# Patient Record
Sex: MALE | Race: WHITE | NOT HISPANIC OR LATINO | Employment: OTHER | URBAN - METROPOLITAN AREA
[De-identification: names, ages, dates, MRNs, and addresses within clinical notes are randomized per-mention and may not be internally consistent; named-entity substitution may affect disease eponyms.]

---

## 2023-03-27 LAB
PROSTATE SPECIFIC AG (NG/ML) IN SER/PLAS: 7.7 NG/ML (ref 0–4)
PROSTATE SPECIFIC AG FREE (NG/ML) IN SER/PLAS: 2.6 NG/ML
PROSTATE SPECIFIC AG FREE/PROSTATE SPECIFIC AG TOTAL IN SER/PLAS: 34 %

## 2024-02-16 PROBLEM — Z95.1 S/P CABG X 3: Status: ACTIVE | Noted: 2024-02-16

## 2024-02-16 PROBLEM — E11.9 DIABETES MELLITUS (MULTI): Status: ACTIVE | Noted: 2024-02-16

## 2024-02-16 PROBLEM — I44.7 LEFT BUNDLE BRANCH BLOCK: Status: ACTIVE | Noted: 2024-02-16

## 2024-02-16 PROBLEM — I35.1 MILD AORTIC INSUFFICIENCY: Status: ACTIVE | Noted: 2024-02-16

## 2024-02-16 PROBLEM — I25.10 CAD (CORONARY ARTERY DISEASE): Status: ACTIVE | Noted: 2024-02-16

## 2024-02-16 RX ORDER — INSULIN GLARGINE 100 [IU]/ML
INJECTION, SOLUTION SUBCUTANEOUS
COMMUNITY
Start: 2022-01-18

## 2024-02-16 RX ORDER — SELENIUM 50 MCG
TABLET ORAL
COMMUNITY

## 2024-02-16 RX ORDER — L. ACIDOPHILUS/L.BULGARICUS 1MM CELL
1 TABLET ORAL DAILY
COMMUNITY
Start: 2022-01-05

## 2024-02-16 RX ORDER — AMLODIPINE BESYLATE 5 MG/1
1 TABLET ORAL DAILY
COMMUNITY
Start: 2022-01-05

## 2024-02-16 RX ORDER — NAPROXEN SODIUM 220 MG/1
1 TABLET, FILM COATED ORAL DAILY
COMMUNITY
Start: 2022-01-18

## 2024-02-16 RX ORDER — ACETAMINOPHEN 325 MG/1
TABLET ORAL EVERY 6 HOURS
COMMUNITY
Start: 2022-01-18

## 2024-02-16 RX ORDER — POLYETHYLENE GLYCOL 3350 17 G/17G
17 POWDER, FOR SOLUTION ORAL
COMMUNITY
Start: 2022-01-16

## 2024-02-16 RX ORDER — ERGOCALCIFEROL 1.25 MG/1
CAPSULE ORAL
COMMUNITY

## 2024-02-16 RX ORDER — IRON POLYSACCHARIDE COMPLEX 150 MG
150 CAPSULE ORAL
COMMUNITY
Start: 2022-01-18

## 2024-02-16 RX ORDER — ATORVASTATIN CALCIUM 80 MG/1
80 TABLET, FILM COATED ORAL DAILY
COMMUNITY
Start: 2022-01-05

## 2024-02-16 RX ORDER — LOSARTAN POTASSIUM 100 MG/1
100 TABLET ORAL DAILY
COMMUNITY
Start: 2022-01-05

## 2024-02-16 RX ORDER — CHOLECALCIFEROL (VITAMIN D3) 1250 MCG
TABLET ORAL
COMMUNITY
Start: 2022-01-05

## 2024-02-16 RX ORDER — AMOXICILLIN 250 MG
CAPSULE ORAL 2 TIMES DAILY PRN
COMMUNITY
Start: 2022-01-16

## 2024-02-16 RX ORDER — FUROSEMIDE 20 MG/1
20 TABLET ORAL DAILY
COMMUNITY
Start: 2022-01-05

## 2024-02-16 RX ORDER — CARVEDILOL 6.25 MG/1
6.25 TABLET ORAL
COMMUNITY
Start: 2022-01-05

## 2024-02-16 RX ORDER — MULTIVITAMIN
1 TABLET ORAL DAILY
COMMUNITY
Start: 2022-01-05

## 2024-07-15 ENCOUNTER — APPOINTMENT (OUTPATIENT)
Dept: CARDIOLOGY | Facility: CLINIC | Age: 79
End: 2024-07-15
Payer: MEDICARE

## 2024-07-15 VITALS
HEIGHT: 68 IN | OXYGEN SATURATION: 98 % | DIASTOLIC BLOOD PRESSURE: 64 MMHG | HEART RATE: 85 BPM | SYSTOLIC BLOOD PRESSURE: 150 MMHG | BODY MASS INDEX: 23.34 KG/M2 | WEIGHT: 154 LBS

## 2024-07-15 DIAGNOSIS — I25.10 CORONARY ARTERY DISEASE INVOLVING NATIVE CORONARY ARTERY OF NATIVE HEART WITHOUT ANGINA PECTORIS: Primary | ICD-10-CM

## 2024-07-15 DIAGNOSIS — I10 PRIMARY HYPERTENSION: ICD-10-CM

## 2024-07-15 DIAGNOSIS — I44.7 LEFT BUNDLE BRANCH BLOCK: ICD-10-CM

## 2024-07-15 PROCEDURE — 1126F AMNT PAIN NOTED NONE PRSNT: CPT | Performed by: INTERNAL MEDICINE

## 2024-07-15 PROCEDURE — 99214 OFFICE O/P EST MOD 30 MIN: CPT | Performed by: INTERNAL MEDICINE

## 2024-07-15 PROCEDURE — 1159F MED LIST DOCD IN RCRD: CPT | Performed by: INTERNAL MEDICINE

## 2024-07-15 PROCEDURE — 1036F TOBACCO NON-USER: CPT | Performed by: INTERNAL MEDICINE

## 2024-07-15 PROCEDURE — 3078F DIAST BP <80 MM HG: CPT | Performed by: INTERNAL MEDICINE

## 2024-07-15 PROCEDURE — 3077F SYST BP >= 140 MM HG: CPT | Performed by: INTERNAL MEDICINE

## 2024-07-15 RX ORDER — SEMAGLUTIDE 1.34 MG/ML
0.5 INJECTION, SOLUTION SUBCUTANEOUS
COMMUNITY

## 2024-07-15 RX ORDER — AMLODIPINE BESYLATE 5 MG/1
5 TABLET ORAL DAILY
Qty: 30 TABLET | Refills: 11 | Status: SHIPPED | OUTPATIENT
Start: 2024-07-15 | End: 2025-07-15

## 2024-07-15 ASSESSMENT — PAIN SCALES - GENERAL: PAINLEVEL: 0-NO PAIN

## 2024-07-15 NOTE — PROGRESS NOTES
Name : Kasi Luna   : 1945   MRN : [id number]   ENC Date : 07/15/2024      Assessment and Plan:  Hypertension: Blood pressures not adequately controlled.  Patient was agreeable to increasing amlodipine to 5 mg daily.  I gave him a printed prescription as [facility].  Coronary artery disease: Status post CABG.  No angina.  Continue aspirin statin and beta-blocker therapy indefinitely.  Left bundle branch block: No syncopal events.  No indication for pacemaker.  Disp: RTO in 1 year    HPI:  Patient returns today feeling quite well.  He reports no angina.  No palpitations.  No syncopal events.  No dyspnea with exertion.  Patient states he does get some intermittent lower extremity edema but he had none on exam today.  He does state that his blood pressure is now running high at home as well as in the physicians office.    Problem list overview:   Patient Active Problem List   Diagnosis    Left bundle branch block    Diabetes mellitus (Multi)    CAD (coronary artery disease)    Mild aortic insufficiency    S/P CABG x 3    Primary hypertension       Meds:   Current Outpatient Medications on File Prior to Visit   Medication Sig Dispense Refill    atorvastatin (Lipitor) 80 mg tablet Take 0.5 tablets (40 mg) by mouth once daily.      semaglutide (Ozempic) 0.25 mg or 0.5 mg(2 mg/1.5 mL) pen injector Inject 0.5 mg under the skin 1 (one) time per week.      [DISCONTINUED] amLODIPine (Norvasc) 2.5 mg tablet Take 1 tablet (2.5 mg) by mouth once daily.      acetaminophen (Tylenol) 325 mg tablet Take by mouth every 6 hours.      aspirin 81 mg chewable tablet Chew 1 tablet (81 mg) once daily.      carvedilol (Coreg) 6.25 mg tablet Take 1 tablet (6.25 mg) by mouth 2 times a day with meals.      cholecalciferol (Vitamin D3) 1,250 mcg (50,000 unit) tablet Take by mouth.      empagliflozin (Jardiance) 10 mg Take 1 tablet (10 mg) by mouth once daily.      ergocalciferol (Vitamin D-2) 1.25 MG (34220  "UT) capsule Take by mouth.      furosemide (Lasix) 20 mg tablet Take 1 tablet (20 mg) by mouth once daily.      insulin glargine (Lantus Solostar U-100 Insulin) 100 unit/mL (3 mL) pen Inject under the skin.      insulin glargine (Lantus) 100 unit/mL injection Inject under the skin.      iron polysaccharides (Nu-Iron,Niferex) 150 mg iron capsule Take 1 capsule (150 mg) by mouth.      lactobacillus acidophilus (Lactobacillus acidoph-L.bulgar) tablet tablet Take 1 tablet by mouth once daily.      lactobacillus acidophilus capsule Take by mouth.      losartan (Cozaar) 100 mg tablet Take 1 tablet (100 mg) by mouth once daily.      multivitamin (Daily Multi-Vitamin) tablet Take 1 tablet by mouth once daily.      polyethylene glycol (Glycolax, Miralax) 17 gram/dose powder Take 17 g by mouth.      sennosides-docusate sodium (Rachna-Colace) 8.6-50 mg tablet Take by mouth 2 times a day as needed.       No current facility-administered medications on file prior to visit.        VS:  /64 (BP Location: Left arm, Patient Position: Sitting, BP Cuff Size: Adult)   Pulse 85   Ht 1.727 m (5' 8\")   Wt 69.9 kg (154 lb)   SpO2 98%   BMI 23.42 kg/m²     Vitals reviewed.   Neck:      Vascular: No JVD.   Pulmonary:      Effort: Pulmonary effort is normal.      Breath sounds: Normal breath sounds.   Cardiovascular:      Normal rate. Regular rhythm.      Murmurs: There is no murmur.      No gallop.    Pulses:     Intact distal pulses.   Edema:     Peripheral edema absent.   Abdominal:      General: Abdomen is flat.      Palpations: Abdomen is soft.   Neurological:      General: No focal deficit present.      Mental Status: Alert.   Psychiatric:         Mood and Affect: Mood normal.         ECG: No results found for this or any previous visit.   ECHO: Results for orders placed in visit on 01/14/22    Echocardiogram    [facility], [address] [place] [address]  Tel [phone] and Fax " [phone]    TRANSTHORACIC ECHOCARDIOGRAM REPORT      Patient Name:     FELIPE ESTRADA    [place] Physician:  [id number] Paul Pena MD  Study Date:       1/14/2022          Referring           [facility]  Physician:  MRN/PID:          [id number]           PCP:  Accession/Order#: [id number]          [place] [id number]  Location:  YOB: 1945          Fellow:  Gender:           M                  Nurse:  Admit Date:       1/10/2022          Sonographer:        Sabrina Robbins  Admission Status: Inpatient -        Additional Staff:  Routine  Height:           172.72 cm          CC Report to:  Weight:           82.10 kg           Study Type:         Echocardiogram  BSA:              1.96 m2  Blood Pressure: 121 /60 mmHg    Diagnosis/ICD: I25.709-Atherosclerosis of coronary artery bypass graft(s),  unspecified, with unspecified angina pectoris  Indication:    S/P CABG  Procedure/CPT: Echo Complete w Full Doppler-[id number]    Patient History:  Pertinent History: S/P CABGX3 (LAD, RCA), CKD.    Study Detail: The following Echo studies were performed: 2D, M-Mode, Doppler and  color flow. Definity used as a contrast agent for endocardial  border definition. Total contrast used for this procedure was 2 mL  via IV push.      PHYSICIAN INTERPRETATION:  Left Ventricle: The left ventricular systolic function is normal, with an estimated ejection fraction of 55-60%. There are no regional wall motion abnormalities. The left ventricular cavity size is normal. The left ventricular septal wall thickness is mildly increased. Abnormal (paradoxical) septal motion, consistent with left bundle branch block and abnormal (paradoxical) septal motion consistent with post-operative status. Spectral Doppler shows a pseudonormal pattern of left ventricular diastolic filling. There is an elevated mean left atrial pressure.  Left Atrium: The left atrium is normal in size.  Right Ventricle: The right ventricle is  normal in size. There is moderately reduced right ventricular systolic function.  Right Atrium: The right atrium is normal in size.  Aortic Valve: The aortic valve is trileaflet. There is mild aortic valve cusp calcification. There is evidence of mildly elevated transaortic gradients consistent with sclerosis of the aortic valve.  The aortic valve dimensionless index is 0.59. There is mild aortic valve regurgitation. The peak instantaneous gradient of the aortic valve is 13.1 mmHg. The mean gradient of the aortic valve is 8.0 mmHg.  Mitral Valve: The mitral valve is normal in structure. There is mild mitral valve regurgitation.  Tricuspid Valve: The tricuspid valve is structurally normal. There is mild tricuspid regurgitation. The Doppler estimated RVSP is mildly elevated at 37.9 mmHg.  Pulmonic Valve: The pulmonic valve is structurally normal. There is physiologic pulmonic valve regurgitation.  Pericardium: There is no pericardial effusion noted.  Aorta: The aortic root is normal.  Systemic Veins: The inferior vena cava was not well visualized.  In comparison to the previous echocardiogram(s): Compared with study from 12/27/2021, the LV EF is increased (was 35-40%); however, Definity was not used in that study.      CONCLUSIONS:  1. The left ventricular systolic function is normal with a 55-60% estimated ejection fraction.  2. Abnormal septal motion consistent with left bundle branch block and abnormal septal motion consistent with post-operative status.  3. Spectral Doppler shows a pseudonormal pattern of left ventricular diastolic filling.  4. There is an elevated mean left atrial pressure.  5. There is moderately reduced right ventricular systolic function.  6. Mildly elevated RVSP.  7. Aortic valve sclerosis.  8. There is mild aortic valve regurgitation.    QUANTITATIVE DATA SUMMARY:  2D MEASUREMENTS:  Normal Ranges:  Ao Root d:     2.60 cm   (2.0-3.7cm)  LAs:           3.80 cm   (2.7-4.0cm)  IVSd:           1.20 cm   (0.6-1.1cm)  LVPWd:         1.00 cm   (0.6-1.1cm)  LVIDd:         4.70 cm   (3.9-5.9cm)  LVIDs:         3.10 cm  LV Mass Index: 95.7 g/m2  LV % FS        34.0 %    LA VOLUME:  Normal Ranges:  LA Vol A4C:        52.0 ml    (22+/-6mL/m2)  LA Vol A2C:        56.8 ml  LA Vol BP:         56.8 ml  LA Vol Index A4C:  26.5ml/m2  LA Vol Index A2C:  29.0 ml/m2  LA Vol Index BP:   29.0 ml/m2  LA Area A4C:       19.0 cm2  LA Area A2C:       19.0 cm2  LA Major Axis A4C: 5.9 cm  LA Major Axis A2C: 5.4 cm  LA Volume Index:   28.4 ml/m2    RA VOLUME BY A/L METHOD:  Normal Ranges:  RA Area A4C: 17.3 cm2    AORTA MEASUREMENTS:  Normal Ranges:  Ao Sinus, d: 2.60 cm (2.1-3.5cm)  Ao STJ, d:   2.10 cm (1.7-3.4cm)  Asc Ao, d:   2.50 cm (2.1-3.4cm)    LV SYSTOLIC FUNCTION BY 2D PLANIMETRY (MOD):  Normal Ranges:  EF-A4C View: 58.5 % (>55%)  EF-A2C View: 58.6 %  EF-Biplane:  59.3 %    LV DIASTOLIC FUNCTION:  Normal Ranges:  MV Peak E:    1.19 m/s    (0.7-1.2 m/s)  MV Peak A:    0.90 m/s    (0.42-0.7 m/s)  E/A Ratio:    1.32        (1.0-2.2)  MV e'         0.06 m/s    (>8.0)  MV lateral e' 0.07 m/s  MV medial e'  0.04 m/s  MV A Dur:     116.00 msec  E/e' Ratio:   21.64       (<8.0)    MITRAL VALVE:  Normal Ranges:  MV DT: 172 msec (150-240msec)    AORTIC VALVE:  Normal Ranges:  AoV Vmax:                1.81 m/s  (<1.7m/s)  AoV Peak P.1 mmHg (<20mmHg)  AoV Mean P.0 mmHg  (1.7-11.5mmHg)  LVOT Max Andrea:            1.15 m/s  (<1.1m/s)  AoV VTI:                 38.00 cm  (18-25cm)  LVOT VTI:                22.60 cm  LVOT Diameter:           2.00 cm   (1.8-2.4cm)  AoV Area, VTI:           1.87 cm2  (2.5-5.5cm2)  AoV Area,Vmax:           2.00 cm2  (2.5-4.5cm2)  AoV Dimensionless Index: 0.59    AORTIC INSUFFICIENCY:  AI Vmax:       3.87 m/s  AI Half-time:  349 msec  AI Decel Rate: 339.50 cm/s2    RIGHT VENTRICLE:  RV 1   3.4 cm  RV 2   2.0 cm  RV 3   7.3 cm  TAPSE: 11.7 mm  RV s'  0.07 m/s    TRICUSPID  VALVE/RVSP:  Normal Ranges:  Peak TR Velocity: 2.87 m/s  RV Syst Pressure: 37.9 mmHg (< 30mmHg)  TV E Vmax:        0.47 m/s  (0.3-0.7m/s)  TV A Vmax:        0.48 m/s      [id number] Paul Pena MD  Electronically signed on 1/14/2022 at 11:14:16 AM        Louie Baez MD

## 2025-07-17 ENCOUNTER — APPOINTMENT (OUTPATIENT)
Dept: CARDIOLOGY | Facility: CLINIC | Age: 80
End: 2025-07-17
Payer: MEDICARE

## 2025-08-16 ENCOUNTER — HOSPITAL ENCOUNTER (EMERGENCY)
Facility: HOSPITAL | Age: 80
Discharge: HOME | End: 2025-08-16
Payer: MEDICARE

## 2025-08-16 ENCOUNTER — APPOINTMENT (OUTPATIENT)
Dept: RADIOLOGY | Facility: HOSPITAL | Age: 80
End: 2025-08-16
Payer: MEDICARE

## 2025-08-16 VITALS
WEIGHT: 148.15 LBS | BODY MASS INDEX: 22.45 KG/M2 | SYSTOLIC BLOOD PRESSURE: 168 MMHG | HEIGHT: 68 IN | RESPIRATION RATE: 17 BRPM | DIASTOLIC BLOOD PRESSURE: 69 MMHG | OXYGEN SATURATION: 97 % | TEMPERATURE: 97 F | HEART RATE: 75 BPM

## 2025-08-16 DIAGNOSIS — D69.6 THROMBOCYTOPENIA: ICD-10-CM

## 2025-08-16 DIAGNOSIS — J06.9 VIRAL UPPER RESPIRATORY TRACT INFECTION: Primary | ICD-10-CM

## 2025-08-16 LAB
ALBUMIN SERPL BCP-MCNC: 3.6 G/DL (ref 3.4–5)
ALP SERPL-CCNC: 68 U/L (ref 33–136)
ALT SERPL W P-5'-P-CCNC: 11 U/L (ref 10–52)
ANION GAP SERPL CALC-SCNC: 13 MMOL/L (ref 10–20)
AST SERPL W P-5'-P-CCNC: 12 U/L (ref 9–39)
BASOPHILS # BLD AUTO: 0.03 X10*3/UL (ref 0–0.1)
BASOPHILS NFR BLD AUTO: 0.2 %
BILIRUB SERPL-MCNC: 0.8 MG/DL (ref 0–1.2)
BUN SERPL-MCNC: 26 MG/DL (ref 6–23)
CALCIUM SERPL-MCNC: 8.9 MG/DL (ref 8.6–10.3)
CHLORIDE SERPL-SCNC: 109 MMOL/L (ref 98–107)
CO2 SERPL-SCNC: 23 MMOL/L (ref 21–32)
CREAT SERPL-MCNC: 1.78 MG/DL (ref 0.5–1.3)
EGFRCR SERPLBLD CKD-EPI 2021: 38 ML/MIN/1.73M*2
EOSINOPHIL # BLD AUTO: 0.17 X10*3/UL (ref 0–0.4)
EOSINOPHIL NFR BLD AUTO: 1.4 %
ERYTHROCYTE [DISTWIDTH] IN BLOOD BY AUTOMATED COUNT: 13.5 % (ref 11.5–14.5)
FLUAV RNA RESP QL NAA+PROBE: NOT DETECTED
FLUBV RNA RESP QL NAA+PROBE: NOT DETECTED
GLUCOSE SERPL-MCNC: 180 MG/DL (ref 74–99)
HCT VFR BLD AUTO: 36.2 % (ref 41–52)
HGB BLD-MCNC: 12.1 G/DL (ref 13.5–17.5)
IMM GRANULOCYTES # BLD AUTO: 0.05 X10*3/UL (ref 0–0.5)
IMM GRANULOCYTES NFR BLD AUTO: 0.4 % (ref 0–0.9)
LYMPHOCYTES # BLD AUTO: 1.47 X10*3/UL (ref 0.8–3)
LYMPHOCYTES NFR BLD AUTO: 12.1 %
MCH RBC QN AUTO: 31.1 PG (ref 26–34)
MCHC RBC AUTO-ENTMCNC: 33.4 G/DL (ref 32–36)
MCV RBC AUTO: 93 FL (ref 80–100)
MONOCYTES # BLD AUTO: 1.04 X10*3/UL (ref 0.05–0.8)
MONOCYTES NFR BLD AUTO: 8.6 %
NEUTROPHILS # BLD AUTO: 9.39 X10*3/UL (ref 1.6–5.5)
NEUTROPHILS NFR BLD AUTO: 77.3 %
NRBC BLD-RTO: 0 /100 WBCS (ref 0–0)
PLATELET # BLD AUTO: 142 X10*3/UL (ref 150–450)
POTASSIUM SERPL-SCNC: 3.8 MMOL/L (ref 3.5–5.3)
PROT SERPL-MCNC: 6.7 G/DL (ref 6.4–8.2)
RBC # BLD AUTO: 3.89 X10*6/UL (ref 4.5–5.9)
RSV RNA RESP QL NAA+PROBE: NOT DETECTED
SARS-COV-2 RNA RESP QL NAA+PROBE: NOT DETECTED
SODIUM SERPL-SCNC: 141 MMOL/L (ref 136–145)
WBC # BLD AUTO: 12.2 X10*3/UL (ref 4.4–11.3)

## 2025-08-16 PROCEDURE — 36415 COLL VENOUS BLD VENIPUNCTURE: CPT | Performed by: PHYSICIAN ASSISTANT

## 2025-08-16 PROCEDURE — 87637 SARSCOV2&INF A&B&RSV AMP PRB: CPT | Performed by: PHYSICIAN ASSISTANT

## 2025-08-16 PROCEDURE — 99284 EMERGENCY DEPT VISIT MOD MDM: CPT | Performed by: PHYSICIAN ASSISTANT

## 2025-08-16 PROCEDURE — 99284 EMERGENCY DEPT VISIT MOD MDM: CPT | Mod: 25

## 2025-08-16 PROCEDURE — 71046 X-RAY EXAM CHEST 2 VIEWS: CPT

## 2025-08-16 PROCEDURE — 80053 COMPREHEN METABOLIC PANEL: CPT | Performed by: PHYSICIAN ASSISTANT

## 2025-08-16 PROCEDURE — 85025 COMPLETE CBC W/AUTO DIFF WBC: CPT | Performed by: PHYSICIAN ASSISTANT

## 2025-08-16 PROCEDURE — 71046 X-RAY EXAM CHEST 2 VIEWS: CPT | Performed by: RADIOLOGY

## 2025-08-16 RX ORDER — BENZONATATE 100 MG/1
200 CAPSULE ORAL 3 TIMES DAILY PRN
Qty: 42 CAPSULE | Refills: 0 | Status: SHIPPED | OUTPATIENT
Start: 2025-08-16 | End: 2025-08-23

## 2025-08-16 ASSESSMENT — PAIN DESCRIPTION - PAIN TYPE: TYPE: ACUTE PAIN

## 2025-08-16 ASSESSMENT — LIFESTYLE VARIABLES
HAVE PEOPLE ANNOYED YOU BY CRITICIZING YOUR DRINKING: NO
EVER FELT BAD OR GUILTY ABOUT YOUR DRINKING: NO
HAVE YOU EVER FELT YOU SHOULD CUT DOWN ON YOUR DRINKING: NO
EVER HAD A DRINK FIRST THING IN THE MORNING TO STEADY YOUR NERVES TO GET RID OF A HANGOVER: NO
TOTAL SCORE: 0

## 2025-08-16 ASSESSMENT — PAIN SCALES - GENERAL: PAINLEVEL_OUTOF10: 0 - NO PAIN

## 2025-08-16 ASSESSMENT — PAIN - FUNCTIONAL ASSESSMENT: PAIN_FUNCTIONAL_ASSESSMENT: 0-10
